# Patient Record
Sex: MALE | Race: WHITE
[De-identification: names, ages, dates, MRNs, and addresses within clinical notes are randomized per-mention and may not be internally consistent; named-entity substitution may affect disease eponyms.]

---

## 2018-05-29 ENCOUNTER — HOSPITAL ENCOUNTER (EMERGENCY)
Dept: HOSPITAL 25 - UCEAST | Age: 28
Discharge: HOME | End: 2018-05-29
Payer: COMMERCIAL

## 2018-05-29 VITALS — SYSTOLIC BLOOD PRESSURE: 148 MMHG | DIASTOLIC BLOOD PRESSURE: 81 MMHG
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
## 2018-05-29 DIAGNOSIS — S61.213A: Primary | ICD-10-CM
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
## 2018-05-29 PROCEDURE — G0463 HOSPITAL OUTPT CLINIC VISIT: HCPCS

## 2018-05-29 PROCEDURE — 12001 RPR S/N/AX/GEN/TRNK 2.5CM/<: CPT

## 2018-05-29 PROCEDURE — 99202 OFFICE O/P NEW SF 15 MIN: CPT

## 2018-05-29 NOTE — UC
Laceration HPI





- HPI Summary


HPI Summary: 





Pt was cutting cheese and injured left middle finger. Pt unable to get bleeding 

to stop so came to UC.  knife not serrated  tdap utd. No nail involvement. No 

other complaints  No anticoagulations mild discomfort no analgesia taken





pt's medications reviewed this visit





- History Of Current Complaint


Chief Complaint: UCLaceration


Stated Complaint: L HAND FINGER LAC


Time Seen by Provider: 05/29/18 22:42


Hx Obtained From: Patient


Laceration Location: Finger


Mechanism Of Injury: Sharp Trauma


Onset/Duration: Sudden Onset


Severity: Mild


Pain Intensity: 2


Pain Scale Used: 0-10 Numeric





- Allergies/Home Medications


Allergies/Adverse Reactions: 


 Allergies











Allergy/AdvReac Type Severity Reaction Status Date / Time


 


No Known Allergies Allergy   Verified 05/29/18 22:15











Home Medications: 


 Home Medications





Ibuprofen TAB* [Advil TAB*] 400 mg PO ONCE PRN 05/29/18 [History Confirmed 05/29 /18]











PMH/Surg Hx/FS Hx/Imm Hx


Previously Healthy: Yes





- Surgical History


Surgical History: None





- Family History


Known Family History: Positive: Hypertension





- Social History


Occupation: Student


Alcohol Use: Occasionally


Substance Use Type: None


Smoking Status (MU): Never Smoked Tobacco





- Immunization History


Most Recent Tetanus Shot: <5 YEARS AS OF 5/29/18





Review of Systems


Constitutional: Negative


Skin: Other - laceration left middle finger


All Other Systems Reviewed And Are Negative: Yes





Physical Exam





- Summary


Physical Exam Summary: 





Vital Signs Reviewed: Yes


A+Ox3, no distress


Eyes: Conjunctiva Clear, 


ENT: Hearing grossly normal  


Neck: Supple


Respiratory: Positive: No respiratory distress, No accessory muscle use 


Cardiovascular: CBT <2  sec


Musculoskeletal Exam: LOO x 4 without difficulty Strength Intact, + flex/ext dip

, pip, mcp without difficulty


Neurological: Positive: Alert,  + gross sensation finger tip


Psychological: Positive: Normal Response To Family


Skin: Positive: pt with thin flap laceration middle left finger, lateral aspect 

and edge of nail no nail involvement


Triage Information Reviewed: Yes


Vital Signs: 


 Initial Vital Signs











Temp  98 F   05/29/18 22:11


 


Pulse  64   05/29/18 22:11


 


Resp  16   05/29/18 22:11


 


BP  148/81   05/29/18 22:11


 


Pulse Ox  100   05/29/18 22:11














Laceration Repair





- Laceration Repair


  ** 1


Description: Linear


Modified For Repair: No


Cleansing Completed Via Routine Prep: Yes


Closure Material: Skin Adhesive


Closure Method: Single Layer





Laceration Course/Dx





- Course/Dx


Course Of Treatment: Pt with small flap abraison left middle finger, no nail 

involvement.  slow oozing wound  RN cleansed wound  d/w pt repair option - pt 

requesing glue.  wound dried, after hemostasis, applied skin adhesive with good 

approximation and satisfactory result.  reviewed wt pt wound care, s/s 

infection.  pt comfortable and in agreement with plan





- Differential Dx - Laceration/Wound


Provider Diagnoses: skin flap laceration





Discharge





- Sign-Out/Discharge


Documenting (check all that apply): Discharge/Admit/Transfer





- Discharge Plan


Condition: Stable


Disposition: HOME


Patient Education Materials:  Laceration (ED), Skin Adhesive Care (ED)


[facility] 


[facility] 


Additional Instructions: 


Okay to take ibuprofen (advil, Motrin) and tylenol every 3 hours for pain or 

fever


keep wound clean and dry until tomorrow - then okay to get wet, pat dry


monitor for signs of infection - redness, red streaking, drainage


seek medical care with any questions or concerns








- Billing Disposition and Condition


Condition: STABLE


Disposition: HOME